# Patient Record
Sex: MALE | Race: WHITE
[De-identification: names, ages, dates, MRNs, and addresses within clinical notes are randomized per-mention and may not be internally consistent; named-entity substitution may affect disease eponyms.]

---

## 2019-03-12 ENCOUNTER — HOSPITAL ENCOUNTER (EMERGENCY)
Dept: HOSPITAL 10 - E/R | Age: 39
Discharge: HOME | End: 2019-03-12
Payer: COMMERCIAL

## 2019-03-12 ENCOUNTER — HOSPITAL ENCOUNTER (EMERGENCY)
Dept: HOSPITAL 91 - E/R | Age: 39
Discharge: HOME | End: 2019-03-12
Payer: COMMERCIAL

## 2019-03-12 VITALS
WEIGHT: 255.54 LBS | WEIGHT: 255.54 LBS | HEIGHT: 72 IN | BODY MASS INDEX: 34.61 KG/M2 | HEIGHT: 72 IN | BODY MASS INDEX: 34.61 KG/M2

## 2019-03-12 VITALS — HEART RATE: 90 BPM | RESPIRATION RATE: 18 BRPM | SYSTOLIC BLOOD PRESSURE: 149 MMHG | DIASTOLIC BLOOD PRESSURE: 97 MMHG

## 2019-03-12 DIAGNOSIS — S05.01XA: ICD-10-CM

## 2019-03-12 DIAGNOSIS — H11.31: Primary | ICD-10-CM
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
## 2019-03-12 DIAGNOSIS — H20.9: ICD-10-CM
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
## 2019-03-12 PROCEDURE — 99282 EMERGENCY DEPT VISIT SF MDM: CPT

## 2019-03-12 RX ADMIN — TETRACAINE HYDROCHLORIDE 1 DROP: 5 SOLUTION OPHTHALMIC at 21:26

## 2019-03-12 NOTE — ERD
ER Documentation


Chief Complaint


Chief Complaint





R EYE PAIN S/P WORK ACCIDENT





HPI


38-year-old  who has a history of cataract surgery in his right 


eye who presents with mild blunt trauma to the right eye.  He states that he was


stacking his gun and the gun fell in the flashlight hit him in the right eye.  


He describes some blurred vision and photosensitivity in the right eye with mild


discomfort.  No obvious vision changes and he does not wear contacts.





ROS


All systems reviewed and are negative except as per history of present illness.





Medications


Home Meds


Active Scripts


Polymyxin B Sulfate-TMP* (Polymyxin B-TMP Eye Drops*) 10 Ml Drops, 1 DROP RIGHT 


EYE QID for 7 Days, EA


   Prov:JAMES OLIVA MD         3/12/19





PMhx/Soc


Medical and Surgical Hx:  pt denies Medical Hx


History of Surgery:  Yes (R eye cataract 2008, R eye lens 2017)


Anesthesia Reaction:  No


Hx Neurological Disorder:  No


Hx Respiratory Disorders:  No


Hx Cardiac Disorders:  No


Hx Psychiatric Problems:  No


Hx Miscellaneous Medical Probl:  No


Hx Alcohol Use:  Yes (occasional)


Hx Substance Use:  No


Hx Tobacco Use:  No


Smoking Status:  Never smoker





FmHx


Family History:  No diabetes





Physical Exam


Vitals





Vital Signs


  Date      Temp  Pulse  Resp  B/P (MAP)   Pulse Ox  O2          O2 Flow    FiO2


Time                                                 Delivery    Rate


   3/12/19  96.8     90    18      149/97        96


     20:47                          (114)





Physical Exam


General: Well developed, well nourished, no acute distress


Head: Normocephalic, atraumatic.


Eyes: Lids and lashes are normal without evidence of edema, pupils are equal and


reactive, slight discomfort is noted to light shining in the right eye with iris


contraction, no proptosis, extraocular movements are intact without pain, no 


evidence of foreign body with lid eversion, no afferent pupillary defect, no 


field cuts, small subconjunctival hematoma noted in the medial aspect 3 o'clock 


position of the right eye


Acuity: Right 20/20 left 20/70 bilateral 20/25


Slit Lamp Exam: Small corneal abrasion noted to the medial aspect 3 o'clock 


position in a wedgelike distribution


Fluorescein Stain: As noted above negative Jonathan sign


ENT: Moist mucous membranes


Neck: Full ROM


Respiratory: No respiratory distress


Cardiovascular: Well perfused distally


Abdominal: Nondistended


: Deferred


MSK: No edema, no unilateral swelling, 5/5 strength


Neurologic: Alert and oriented, moving all extremities, normal speech, steady 


gait


Skin: No rash


Psych: Normal mood


Results 24 hrs





Current Medications


 Medications
   Dose
          Sig/Gaudencio
       Start Time
   Status  Last


 (Trade)       Ordered        Route
 PRN     Stop Time              Admin
Dose


                              Reason                                Admin


 Tetracaine     1 drop         ONCE  ONCE
    3/12/19       DC           3/12/19


HCl
                          RIGHT EYE
     21:30
                       21:26



(Tetracaine                                  3/12/19 21:31


0.5%



Steri-Unit


Sol)








Procedures/MDM


The patient has a clinical exam consistent with mild trauma to the right eye 


without evidence of globe rupture.  The patient has a normal pupil, no 


irregularity, negative Jonathan sign.  Patient has no midface tenderness 


concerning for zygomatic arch or maxillary sinus or orbital fracture.





The patient's visual acuities are reassuring.  The patient likely has traumatic 


iritis with concomitant corneal abrasion and subconjunctival hematoma.  Prompt 


outpatient ophthalmology follow-up would be appropriate given his prior history 


of surgery in that eye.  He states that he can follow-up tomorrow.  The patient 


was advised to be on desk duty until he can be cleared by ophthalmology. 





Topical antibiotics would be most appropriate.  Outpatient follow-up with 


ophthalmology for possible topical NSAIDs or steroids would be reasonable.





The patient does not have an identifiable emergent medical condition that 


warrants inpatient hospitalization at this time.  The patient is deemed safe for


discharge with outpatient follow-up.





We discussed follow up with the patient's primary care doctor within 24 to 48 


hours as needed.  We also discussed return to the emergency room for worsening 


symptoms or worsening condition.





Outpatient referral: Ophthalmology





Discharge Medications:


Polytrim





Departure


Diagnosis:  


   Primary Impression:  


   Subconjunctival hematoma


   Laterality:  right  Qualified Codes:  H11.31 - Conjunctival hemorrhage, right


   eye


   Additional Impressions:  


   Corneal abrasion


   Encounter type:  initial encounter  Laterality:  right  Qualified Codes:  


   S05.01XA - Injury of conjunctiva and corneal abrasion without foreign body, 


   right eye, initial encounter


   Traumatic iritis


Condition:  Stable


Patient Instructions:  Corneal Abrasion, Subconjunctival Hemorrhage


Referrals:  


COMM[facility]ITY CLINICS


YOU HAVE RECEIVED A MEDICAL SCREENING EXAM AND THE RESULTS INDICATE THAT YOU DO 


NOT HAVE A CONDITION THAT REQUIRES URGENT TREATMENT IN THE EMERGENCY DEPARTMENT.





FURTHER EVALUATION AND TREATMENT OF YOUR CONDITION CAN WAIT UNTIL YOU ARE SEEN 


IN YOUR DOCTORS OFFICE WITHIN THE NEXT 1-2 DAYS. IT IS YOUR RESPONSIBILITY TO 


[facility]





IF YOU HAVE A PRIMARY DOCTOR


--you should call your primary doctor and schedule an appointment





IF YOU DO NOT HAVE A PRIMARY DOCTOR YOU CAN CALL OUR PHYSICIAN REFERRAL HOTLINE 


AT


 [phone] 





IF YOU CAN NOT AFFORD TO SEE A PHYSICIAN YOU CAN CHOSE FROM THE FOLLOWING 


[facility] [phone] [address] [facility]





[facility] [phone] [address] [facility]





[facility] [phone] [address] [facility] [phone] [address] [facility] [phone] [address] [facility]. [facility] [phone] [address] [facility]. [facility]


YOU HAVE RECEIVED A MEDICAL SCREENING EXAM AND THE RESULTS INDICATE THAT YOU DO 


NOT HAVE A CONDITION THAT REQUIRES URGENT TREATMENT IN THE EMERGENCY DEPARTMENT.





FURTHER EVALUATION AND TREATMENT OF YOUR CONDITION CAN WAIT UNTIL YOU ARE SEEN 


IN YOUR DOCTORS OFFICE WITHIN THE NEXT 1-2 DAYS. IT IS YOUR RESPONSIBILITY TO 


MAKE AN APPOINTMENT FOR FOLOW-UP CARE.





IF YOU HAVE A PRIMARY DOCTOR


--you should call your primary doctor and schedule and appointment





IF YOU DO NOT HAVE A PRIMARY DOCTOR YOU CAN CALL OUR PHYSICIAN REFERRAL HOTLINE 


AT [phone].





IF YOU CAN NOT AFFORD TO SEE A PHYSICIAN YOU CAN CHOSE FROM THE FOLLOWING [facility]


INSTITUTIONS:





[facility]


[address] [place] [address]





[facility]


[address] [place] [address]





[facility]


[address] [place] [address]





[facility]


Hours: Mon - Fri


9:00 AM - 5:00 PM





Additional Instructions:  


Return to this facility TOMORROW for a repeat exam.Return sooner if your 


condition worsens before then.











JAMES OLIVA MD          Mar 12, 2019 23:04

## 2019-04-20 ENCOUNTER — HOSPITAL ENCOUNTER (EMERGENCY)
Dept: HOSPITAL 10 - FTE | Age: 39
Discharge: HOME | End: 2019-04-20
Payer: SELF-PAY

## 2019-04-20 ENCOUNTER — HOSPITAL ENCOUNTER (EMERGENCY)
Dept: HOSPITAL 91 - FTE | Age: 39
Discharge: HOME | End: 2019-04-20
Payer: SELF-PAY

## 2019-04-20 VITALS
BODY MASS INDEX: 34.61 KG/M2 | BODY MASS INDEX: 34.61 KG/M2 | HEIGHT: 72 IN | WEIGHT: 255.54 LBS | WEIGHT: 255.54 LBS | HEIGHT: 72 IN

## 2019-04-20 VITALS — DIASTOLIC BLOOD PRESSURE: 77 MMHG | HEART RATE: 86 BPM | SYSTOLIC BLOOD PRESSURE: 132 MMHG | RESPIRATION RATE: 18 BRPM

## 2019-04-20 DIAGNOSIS — H10.022: Primary | ICD-10-CM

## 2019-04-20 PROCEDURE — 99283 EMERGENCY DEPT VISIT LOW MDM: CPT

## 2019-04-20 NOTE — ERD
ER Documentation


Chief Complaint


Chief Complaint





bilateral eye redness, more on left x 1 day





HPI


This is a 38-year-old male who presents ED with left eye redness and discharge 


x1 day.  Patient denies any trauma or injury to eye.  Denies fever, chills, 


blurry vision, changes in vision, and all other symptoms.





ROS


All systems reviewed and are negative except as per history of present illness.





Medications


Home Meds


Active Scripts


Polymyxin B Sulfate-TMP* (Polymyxin B-TMP Eye Drops*) 10 Ml Drops, 1 DROP LEFT 


EYE QID for 7 Days, EA


   Prov:CHA ESPINO PA-C         19


Polymyxin B Sulfate-TMP* (Polymyxin B-TMP Eye Drops*) 10 Ml Drops, 1 DROP RIGHT 


EYE QID for 7 Days, EA


   Prov:JAMES OLIVA MD         3/12/19





Allergies


Allergies:  


Coded Allergies:  


     No Known Drug Allergies (Verified  Allergy, Unknown, 19)





PMhx/Soc


History of Surgery:  Yes (R eye cataract , R eye lens )


Anesthesia Reaction:  No


Hx Neurological Disorder:  No


Hx Respiratory Disorders:  No


Hx Cardiac Disorders:  No


Hx Psychiatric Problems:  No


Hx Miscellaneous Medical Probl:  No


Hx Alcohol Use:  Yes (occasional)


Hx Substance Use:  No


Hx Tobacco Use:  No


Smoking Status:  Never smoker





FmHx


Family History:  No diabetes





Physical Exam


Vitals





Vital Signs


  Date      Temp  Pulse  Resp  B/P (MAP)   Pulse Ox  O2          O2 Flow    FiO2


Time                                                 Delivery    Rate


   19  98.2     86    18      132/77        97


     02:53                           (95)





Physical Exam


Const:   No acute distress


Head:   Atraumatic 


Eyes:    Left eye injected with yellow discharge noted on eyelashes, no foreign 


body noted, PERRLA, EOMs intact bilaterally x6, no right eye injection


ENT:    Normal External Ears, Nose and Mouth.


Neur:   Awake and alert


Psych:    Normal Mood and Affect





Procedures/MDM


ER COURSE:





The patient was stable throughout ED course.





I kept the patient and/or family informed of laboratory and diagnostic imaging 


results throughout the emergency room course.





The patient was promptly evaluated and a treatment plan was devised based on H&P


and other data. This plan was discussed with the patient who agreed and had no 


further questions or concerns prior to discharge.








MEDICAL DECISION MAKIN-year-old male presents to ED with left eye redness and discharge x1 day.  


Suspicion for orbital cellulitis is low.  Patient does not have any eye pain or 


painful extraocular movements and there is no surrounding erythema.  Patient is 


afebrile and extremely well-appearing.  Patient has denied any trauma to the eye


and any vision loss or vision changes.  No evidence of globe perforation or 


other ophthalmic emergencies. Pt  will be sent home with abx, pt is to follow-up


with her primary care doctor within 1-2 days.  return to ER sooner if symptoms 


worsen.  My medical decision making shared with the patient she understands and 


agrees with plan.








DISPOSITION PLAN:





We discussed follow up with the patient's primary care doctor within 24 to 48 


hours.  Patient counseled regarding my diagnostic impression and care plan. 


Prior to discharge all questions answered. Pt agrees with treatment plan and 


understands strict return precautions. Precautionary instructions provided 


including instructions to return to the ER if not improving or for any worsening


or changing symptoms or concerns.





ExitCare instructions provided.





Prior to discharge, patients vital signs have been reviewed








SPECIALIST FOLLOW UP RECOMMENDED: None





Patient has been advised to follow up with primary care in 1-2 days.








Disclaimer: Inadvertent spelling and grammatical errors are likely due to 


EHR/dictation software use and do not reflect on the overall quality of patient 


care. Also, please note that the electronic time recorded on this note does not 


necessarily reflect the actual time of the patient encounter.








Blood Pressure Assessment: Patient's blood pressure was elevated (>120/80) but 


appears stable without evidence of hypertension emergency or urgency.  The 


patient was counseled about the risks of hypertension and urged to pursue 


outpatient monitoring and therapy within a week with their primary care 


physician.





Departure


Diagnosis:  


   Primary Impression:  


   Bacterial conjunctivitis of left eye


Condition:  Stable


Patient Instructions:  Conjunctivitis, Bacterial


Referrals:  


[facility]


YOU HAVE RECEIVED A MEDICAL SCREENING EXAM AND THE RESULTS INDICATE THAT YOU DO 


NOT HAVE A CONDITION THAT REQUIRES URGENT TREATMENT IN THE EMERGENCY DEPARTMENT.





FURTHER EVALUATION AND TREATMENT OF YOUR CONDITION CAN WAIT UNTIL YOU ARE SEEN 


IN YOUR DOCTORS OFFICE WITHIN THE NEXT 1-2 DAYS. IT IS YOUR RESPONSIBILITY TO 


MAKE AN APPOINTMENT FOR FOLOW-UP CARE.





IF YOU HAVE A PRIMARY DOCTOR


--you should call your primary doctor and schedule an appointment





IF YOU DO NOT HAVE A PRIMARY DOCTOR YOU CAN CALL OUR PHYSICIAN REFERRAL HOTLINE 


AT


 [phone] 





IF YOU CAN NOT AFFORD TO SEE A PHYSICIAN YOU CAN CHOSE FROM THE FOLLOWING 


[facility] [phone] [address] [facility]





[facility] [phone] [address] [facility]





[facility] [phone] [address] [facility] [phone] [address] [facility] [phone] [address] [facility]. [facility] [phone]


[address] [place]





Additional Instructions:  


Patient advised to return to the ED immediately for new or worsening symptoms. 


Patient advised to follow up with primary care provider in the next 24-48 hours.


Patient verbalized understanding and agrees with treatment plan and course of 


action.





If patient has no primary care they may follow up with one of the [facility] 


clinics listed on the following page or one of the options listed below








[facility]


[address] [place] [address]





or





[facility]


[address] [place] [address]





or





[facility]


[address] [place] [address]











CHA ESPINO PA-C          2019 03:17